# Patient Record
Sex: MALE | Race: WHITE | URBAN - METROPOLITAN AREA
[De-identification: names, ages, dates, MRNs, and addresses within clinical notes are randomized per-mention and may not be internally consistent; named-entity substitution may affect disease eponyms.]

---
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
## 2024-04-30 ENCOUNTER — OFFICE VISIT (OUTPATIENT)
Dept: FAMILY MEDICINE CLINIC | Age: 19
End: 2024-04-30
Payer: COMMERCIAL

## 2024-04-30 VITALS
SYSTOLIC BLOOD PRESSURE: 124 MMHG | OXYGEN SATURATION: 98 % | TEMPERATURE: 99.5 F | DIASTOLIC BLOOD PRESSURE: 83 MMHG | HEART RATE: 90 BPM

## 2024-04-30 DIAGNOSIS — J06.9 UPPER RESPIRATORY INFECTION WITH COUGH AND CONGESTION: Primary | ICD-10-CM

## 2024-04-30 PROCEDURE — G8427 DOCREV CUR MEDS BY ELIG CLIN: HCPCS

## 2024-04-30 PROCEDURE — G8421 BMI NOT CALCULATED: HCPCS

## 2024-04-30 PROCEDURE — 1036F TOBACCO NON-USER: CPT

## 2024-04-30 PROCEDURE — 99203 OFFICE O/P NEW LOW 30 MIN: CPT

## 2024-04-30 RX ORDER — AZITHROMYCIN 250 MG/1
TABLET, FILM COATED ORAL
Qty: 6 TABLET | Refills: 0 | Status: SHIPPED | OUTPATIENT
Start: 2024-04-30 | End: 2024-05-10

## 2024-04-30 RX ORDER — BROMPHENIRAMINE MALEATE, PSEUDOEPHEDRINE HYDROCHLORIDE, AND DEXTROMETHORPHAN HYDROBROMIDE 2; 30; 10 MG/5ML; MG/5ML; MG/5ML
5 SYRUP ORAL 4 TIMES DAILY PRN
Qty: 118 ML | Refills: 0 | Status: SHIPPED | OUTPATIENT
Start: 2024-04-30 | End: 2024-05-07

## 2024-04-30 ASSESSMENT — ENCOUNTER SYMPTOMS
HOARSE VOICE: 0
COUGH: 1
HEARTBURN: 0
RHINORRHEA: 1
SINUS PRESSURE: 1
SHORTNESS OF BREATH: 0
SINUS COMPLAINT: 1
EYE DISCHARGE: 1
SORE THROAT: 0
SWOLLEN GLANDS: 0
DIARRHEA: 0
CONSTIPATION: 0
EYE ITCHING: 0
HEMOPTYSIS: 0
EYE REDNESS: 0
EYE PAIN: 0

## 2024-04-30 NOTE — PROGRESS NOTES
[facility]  [address]  [address]  [facility] [address]  Dept: [phone]  Dept Fax: [phone]    Yoni Hunter is a 18 y.o. male who presents to the urgent care today for his medical conditions/complaints as notedbelow.  Yoni Hunter is c/o of Congestion (Onset 1 week worse at night ) and Cough      HPI:     Patient presents to the [facility] for evaluation of a cough with congestion, x 1 week      Cough  This is a new problem. The current episode started in the past 7 days. The problem has been gradually worsening. The problem occurs every few minutes (worse at night). The cough is Productive of sputum. Associated symptoms include headaches, nasal congestion, postnasal drip and rhinorrhea. Pertinent negatives include no chills, ear congestion, ear pain, eye redness, fever, heartburn, hemoptysis, myalgias, rash, sore throat, shortness of breath, sweats or weight loss. He has tried nothing for the symptoms. There is no history of asthma, bronchitis, environmental allergies or pneumonia.   Sinus Problem  This is a new problem. The current episode started in the past 7 days. The problem is unchanged. There has been no fever. The pain is mild. Associated symptoms include congestion, coughing, headaches and sinus pressure. Pertinent negatives include no chills, diaphoresis, ear pain, hoarse voice, neck pain, shortness of breath, sneezing, sore throat or swollen glands. Treatments tried: claritin. The treatment provided mild relief.       No past medical history on file.     Current Outpatient Medications   Medication Sig Dispense Refill    azithromycin (ZITHROMAX) 250 MG tablet 500mg on day 1 followed by 250mg on days 2 - 5 6 tablet 0    brompheniramine-pseudoephedrine-DM 2-30-10 MG/5ML syrup Take 5 mLs by mouth 4 times daily as needed for Cough or Congestion 118 mL 0     No current facility-administered medications for